# Patient Record
Sex: FEMALE | Race: ASIAN | NOT HISPANIC OR LATINO | URBAN - METROPOLITAN AREA
[De-identification: names, ages, dates, MRNs, and addresses within clinical notes are randomized per-mention and may not be internally consistent; named-entity substitution may affect disease eponyms.]

---

## 2019-06-21 ENCOUNTER — OUTPATIENT (OUTPATIENT)
Dept: OUTPATIENT SERVICES | Facility: HOSPITAL | Age: 19
LOS: 1 days | End: 2019-06-21

## 2019-06-21 VITALS
OXYGEN SATURATION: 96 % | SYSTOLIC BLOOD PRESSURE: 110 MMHG | WEIGHT: 145.06 LBS | RESPIRATION RATE: 18 BRPM | HEART RATE: 64 BPM | TEMPERATURE: 98 F | DIASTOLIC BLOOD PRESSURE: 80 MMHG | HEIGHT: 60 IN

## 2019-06-21 DIAGNOSIS — J35.8 OTHER CHRONIC DISEASES OF TONSILS AND ADENOIDS: ICD-10-CM

## 2019-06-21 LAB
HCG SERPL-ACNC: < 5 MIU/ML — SIGNIFICANT CHANGE UP
HCT VFR BLD CALC: 43.5 % — SIGNIFICANT CHANGE UP (ref 34.5–45)
HGB BLD-MCNC: 13.9 G/DL — SIGNIFICANT CHANGE UP (ref 11.5–15.5)
MCHC RBC-ENTMCNC: 29.6 PG — SIGNIFICANT CHANGE UP (ref 27–34)
MCHC RBC-ENTMCNC: 32 % — SIGNIFICANT CHANGE UP (ref 32–36)
MCV RBC AUTO: 92.6 FL — SIGNIFICANT CHANGE UP (ref 80–100)
NRBC # FLD: 0 K/UL — SIGNIFICANT CHANGE UP (ref 0–0)
PLATELET # BLD AUTO: 358 K/UL — SIGNIFICANT CHANGE UP (ref 150–400)
PMV BLD: 9.6 FL — SIGNIFICANT CHANGE UP (ref 7–13)
RBC # BLD: 4.7 M/UL — SIGNIFICANT CHANGE UP (ref 3.8–5.2)
RBC # FLD: 13.3 % — SIGNIFICANT CHANGE UP (ref 10.3–14.5)
WBC # BLD: 10.3 K/UL — SIGNIFICANT CHANGE UP (ref 3.8–10.5)
WBC # FLD AUTO: 10.3 K/UL — SIGNIFICANT CHANGE UP (ref 3.8–10.5)

## 2019-06-21 NOTE — H&P PST ADULT - ASSESSMENT
18 yo female presents to PST unit with pre-op diagnosis of other chronic diseases of tonsils and adenoids scheduled for tonsillectomy and adenoidectomy on 07/11/2019. She reports enlarged tonsils and  frequent tonsillitis.

## 2019-06-21 NOTE — H&P PST ADULT - NSANTHOSAYNRD_GEN_A_CORE
No. BIANCA screening performed.  STOP BANG Legend: 0-2 = LOW Risk; 3-4 = INTERMEDIATE Risk; 5-8 = HIGH Risk

## 2019-06-21 NOTE — H&P PST ADULT - RS GEN PE MLT RESP DETAILS PC
airway patent/clear to auscultation bilaterally/good air movement/respirations non-labored/breath sounds equal/no wheezes

## 2019-06-21 NOTE — H&P PST ADULT - ENMT COMMENTS
mild discomfort when swallowing food, pre-op diagnosis other chronic diseases of tonsils and adenoids.

## 2019-06-21 NOTE — H&P PST ADULT - HISTORY OF PRESENT ILLNESS
20 yo female presents to PST unit with pre-op diagnosis of other chronic diseases of tonsils and adenoids scheduled for tonsillectomy and adenoidectomy on 07/11/2019. She reports frequent tonsillitis. 20 yo female presents to PST unit with pre-op diagnosis of other chronic diseases of tonsils and adenoids scheduled for tonsillectomy and adenoidectomy on 07/11/2019. She reports enlarged tonsils and  frequent tonsillitis.

## 2019-06-21 NOTE — H&P PST ADULT - NSICDXPROBLEM_GEN_ALL_CORE_FT
PROBLEM DIAGNOSES  Problem: Other chronic diseases of tonsils and adenoids  Assessment and Plan:  Scheduled for tonsillectomy and adenoidectomy on 07/11/2019.   Verbal and written pre-op instructions provided to patient. Patient verbalized understanding.  Pepcid for GI prophylaxis provided.   Urine pregnancy test DOS-Urine cup provided.

## 2019-07-10 ENCOUNTER — TRANSCRIPTION ENCOUNTER (OUTPATIENT)
Age: 19
End: 2019-07-10

## 2019-07-11 ENCOUNTER — OUTPATIENT (OUTPATIENT)
Dept: OUTPATIENT SERVICES | Facility: HOSPITAL | Age: 19
LOS: 1 days | Discharge: HOME | End: 2019-07-11
Payer: MEDICAID

## 2019-07-11 VITALS
OXYGEN SATURATION: 99 % | RESPIRATION RATE: 16 BRPM | TEMPERATURE: 99 F | WEIGHT: 145.06 LBS | DIASTOLIC BLOOD PRESSURE: 76 MMHG | HEART RATE: 86 BPM | SYSTOLIC BLOOD PRESSURE: 109 MMHG | HEIGHT: 60 IN

## 2019-07-11 VITALS
HEART RATE: 78 BPM | DIASTOLIC BLOOD PRESSURE: 69 MMHG | TEMPERATURE: 97 F | SYSTOLIC BLOOD PRESSURE: 103 MMHG | RESPIRATION RATE: 16 BRPM | OXYGEN SATURATION: 99 %

## 2019-07-11 DIAGNOSIS — J35.8 OTHER CHRONIC DISEASES OF TONSILS AND ADENOIDS: ICD-10-CM

## 2019-07-11 PROCEDURE — 88304 TISSUE EXAM BY PATHOLOGIST: CPT | Mod: 26

## 2019-07-11 RX ORDER — AMOXICILLIN 250 MG/5ML
10 SUSPENSION, RECONSTITUTED, ORAL (ML) ORAL
Qty: 200 | Refills: 0
Start: 2019-07-11 | End: 2019-07-17

## 2019-07-11 NOTE — ASU DISCHARGE PLAN (ADULT/PEDIATRIC) - CARE PROVIDER_API CALL
Armen Spencre)  Otolaryngology  [address] [place] [address]  Phone: [phone]  Fax: [phone]  Follow Up Time:

## 2019-07-11 NOTE — BRIEF OPERATIVE NOTE - NSICDXBRIEFPOSTOP_GEN_ALL_CORE_FT
POST-OP DIAGNOSIS:  Asymmetric tonsils 11-Jul-2019 09:30:59  Armen Spencer  Chronic tonsillitis 11-Jul-2019 09:30:40  Armen Spencer

## 2019-07-11 NOTE — ASU DISCHARGE PLAN (ADULT/PEDIATRIC) - CALL YOUR DOCTOR IF YOU HAVE ANY OF THE FOLLOWING:
Fever greater than (need to indicate Fahrenheit or Celsius)/Bleeding that does not stop/Nausea and vomiting that does not stop/Inability to tolerate liquids or foods/Pain not relieved by Medications/Unable to urinate/Excessive diarrhea/Increased irritability or sluggishness

## 2019-07-11 NOTE — BRIEF OPERATIVE NOTE - NSICDXBRIEFPREOP_GEN_ALL_CORE_FT
PRE-OP DIAGNOSIS:  Asymmetric tonsils 11-Jul-2019 09:30:24  Amren Spencer  Chronic tonsillitis 11-Jul-2019 09:30:06  Armen Spencer

## 2020-10-31 NOTE — H&P PST ADULT - TONSILS
Hernesto Andresde is due for a CMP recheck. TSH in March 2021. Would you like to add any other labs?       Future Appointments   Date Time Provider Ciara Lockhart   11/2/2020  1:30 PM REF [facility] REF [facility] Ref Lab [facility]
Nothing else
WHEN IS SHE DUE FOR LABS NEXT?
enlarged

## 2021-03-10 ENCOUNTER — TRANSCRIPTION ENCOUNTER (OUTPATIENT)
Age: 21
End: 2021-03-10

## 2021-06-13 ENCOUNTER — TRANSCRIPTION ENCOUNTER (OUTPATIENT)
Age: 21
End: 2021-06-13

## 2021-08-01 ENCOUNTER — TRANSCRIPTION ENCOUNTER (OUTPATIENT)
Age: 21
End: 2021-08-01

## 2021-09-09 ENCOUNTER — TRANSCRIPTION ENCOUNTER (OUTPATIENT)
Age: 21
End: 2021-09-09

## 2022-05-26 NOTE — H&P PST ADULT - ENDOCRINE
Thoracic Consult  Assessment/Plan:   24-year-old female with a previous 20 pack-year smoking history quit greater than 15 years ago who has an irregular left upper lobe lung consolidation    I extensively reviewed imaging with this patient  Given her recent upper respiratory infection and the imaging characteristics of this area I suspect this is a benign infectious or inflammatory process  With that I would like to repeat her CT in 3 months  We will order a noncontrast navigational bronchoscopy protocol CT for August of 2022 and see her back in our office after that  She voiced understanding and agreement with this treatment plan  Monse Shepherd MD      Diagnoses and all orders for this visit:    Lung mass  Comments:  Etiology of this is uncertain  Malignancy is a possibility  Further imaging will be necessary  Orders:  -     Ambulatory Referral to Thoracic Oncology  -     CT chest wo contrast; Future            Thoracic History     Problem:  A irregular left upper lobe 3 cm opacity  Previous 20 pack-year smoking history    Procedure:     Pathology:      Subjective:    Patient ID: Mayela Cast is a 77 y o  female  HPI  Lauri Santiago is a 24-year-old female with history of hypertension, migraines, aortic insufficiency, stage III CKD, hyperlipidemia, and a previous 20 pack-year smoking history and obesity with a BMI of 35 who we are seeing for a left upper lobe opacity  Due to her previous smoking history her PCP ordered a lung cancer screening scan  Three weeks prior to her scan she had an upper respiratory symptoms with significant productive cough and shortness of breath  She was COVID negative at that time  Currently she states her breathing is getting better  She denies any current cough  No hemoptysis  No recent fevers, chills or unexplained weight loss  She denies a family history of lung malignancy      The following portions of the patient's history were reviewed and updated as appropriate: allergies, current medications, past family history, past medical history, past social history, past surgical history and problem list     Past Medical History:   Diagnosis Date    Allergic rhinitis     Aortic regurgitation     HTN (hypertension)     Hyperlipidemia     Nonrheumatic aortic (valve) insufficiency     Sleep apnea     Vitamin B12 deficiency       Past Surgical History:   Procedure Laterality Date    BUNIONECTOMY      CHOLECYSTECTOMY      COLONOSCOPY  2018    DILATION AND CURETTAGE OF UTERUS      MAMMO (HISTORICAL)  2017 and 3/21/2017     [place] LAP,APPENDECTOMY N/A 2021    Procedure: APPENDECTOMY LAPAROSCOPIC;  Surgeon: Jone Hooker MD;  Location:  MAIN OR;  Service: Colorectal    WISDOM TOOTH EXTRACTION        Family History   Problem Relation Age of Onset    Coronary artery disease Mother     Kidney cancer Father     No Known Problems Sister     No Known Problems Sister     No Known Problems Sister       Social History     Socioeconomic History    Marital status: /Civil Union     Spouse name: Not on file    Number of children: 2    Years of education: Not on file    Highest education level: Not on file   Occupational History    Not on file   Tobacco Use    Smoking status: Former Smoker     Types: Cigarettes     Quit date:      Years since quittin 4    Smokeless tobacco: Never Used   Vaping Use    Vaping Use: Never used   Substance and Sexual Activity    Alcohol use:  Yes     Alcohol/week: 5 0 standard drinks     Types: 5 Standard drinks or equivalent per week    Drug use: Never    Sexual activity: Not Currently   Other Topics Concern    Not on file   Social History Narrative    2 daughters    Travel outside [place]: No      Social Determinants of Health     Financial Resource Strain: Not on file   Food Insecurity: Not on file   Transportation Needs: Not on file   Physical Activity: Not on file   Stress: Not on file Social Connections: Not on file   Intimate Partner Violence: Not on file   Housing Stability: Not on file      Review of Systems   Constitutional: Negative for activity change, appetite change, chills, diaphoresis, fatigue, fever and unexpected weight change  HENT: Negative  Eyes: Negative  Respiratory: Negative for apnea, cough, chest tightness, shortness of breath, wheezing and stridor  Cardiovascular: Negative for chest pain, palpitations and leg swelling  Gastrointestinal: Negative for abdominal distention, abdominal pain, blood in stool, constipation, diarrhea, nausea and vomiting  Endocrine: Negative  Genitourinary: Negative  Musculoskeletal: Negative  Skin: Negative  Allergic/Immunologic: Negative  Neurological: Negative  Hematological: Negative  Psychiatric/Behavioral: Negative  Objective:   Physical Exam  Vitals and nursing note reviewed  Constitutional:       General: She is not in acute distress  Appearance: She is well-developed  She is not diaphoretic  HENT:      Head: Normocephalic and atraumatic  Mouth/Throat:      Pharynx: No oropharyngeal exudate  Eyes:      General: No scleral icterus  Conjunctiva/sclera: Conjunctivae normal       Pupils: Pupils are equal, round, and reactive to light  Neck:      Thyroid: No thyromegaly  Vascular: No JVD  Trachea: No tracheal deviation  Cardiovascular:      Rate and Rhythm: Normal rate and regular rhythm  Heart sounds: Normal heart sounds  No murmur heard  No friction rub  No gallop  Pulmonary:      Effort: Pulmonary effort is normal  No respiratory distress  Breath sounds: Normal breath sounds  No wheezing or rales  Comments: Normal work of breathing on room air  No appreciable wheezing  Chest:      Chest wall: No tenderness  Abdominal:      General: Bowel sounds are normal  There is no distension  Palpations: Abdomen is soft  There is no mass  Tenderness: There is no abdominal tenderness  There is no guarding or rebound  Musculoskeletal:         General: No tenderness or deformity  Normal range of motion  Cervical back: Normal range of motion and neck supple  Skin:     General: Skin is warm and dry  Coloration: Skin is not pale  Findings: No erythema or rash  Neurological:      Mental Status: She is alert and oriented to person, place, and time  Psychiatric:         Behavior: Behavior normal          Thought Content: Thought content normal          Judgment: Judgment normal      /76   Pulse 81   Temp 98 °F (36 7 °C)   Resp 17   Ht 5' 6" (1 676 m)   Wt 99 8 kg (220 lb 0 3 oz)   SpO2 97%   BMI 35 51 kg/m²     XR chest pa & lateral    Result Date: 7/29/2021  Impression No acute cardiopulmonary disease  Workstation performed: [id number]      I personally reviewed her lung cancer screening CT from 05/19/2022  This shows an irregular left upper lobe patchy consolidation  In total the dimensions of this are known 3 cm but this is not continuous  There is patchy inflammatory changes  The largest solid nodule was 8 mm    There is a mild slightly enlarged pretracheal lymph node with a fat center measuring 1 2 cm      Macey Cabral MD  Thoracic Surgeon    (Available by [facility] Text) negative

## 2022-07-03 ENCOUNTER — NON-APPOINTMENT (OUTPATIENT)
Age: 22
End: 2022-07-03

## 2023-01-17 ENCOUNTER — NON-APPOINTMENT (OUTPATIENT)
Age: 23
End: 2023-01-17

## 2023-07-30 ENCOUNTER — NON-APPOINTMENT (OUTPATIENT)
Age: 23
End: 2023-07-30

## 2023-08-01 PROBLEM — J35.8 OTHER CHRONIC DISEASES OF TONSILS AND ADENOIDS: Chronic | Status: ACTIVE | Noted: 2019-06-21

## 2023-08-02 ENCOUNTER — APPOINTMENT (OUTPATIENT)
Dept: ORTHOPEDIC SURGERY | Facility: CLINIC | Age: 23
End: 2023-08-02
Payer: COMMERCIAL

## 2023-08-02 VITALS — WEIGHT: 150 LBS | BODY MASS INDEX: 28.32 KG/M2 | HEIGHT: 61 IN

## 2023-08-02 DIAGNOSIS — S93.401A SPRAIN OF UNSPECIFIED LIGAMENT OF RIGHT ANKLE, INITIAL ENCOUNTER: ICD-10-CM

## 2023-08-02 DIAGNOSIS — Z78.9 OTHER SPECIFIED HEALTH STATUS: ICD-10-CM

## 2023-08-02 PROBLEM — Z00.00 ENCOUNTER FOR PREVENTIVE HEALTH EXAMINATION: Status: ACTIVE | Noted: 2023-08-02

## 2023-08-02 PROCEDURE — 99024 POSTOP FOLLOW-UP VISIT: CPT

## 2023-08-02 RX ORDER — MELOXICAM 15 MG/1
15 TABLET ORAL DAILY
Qty: 30 | Refills: 3 | Status: COMPLETED | COMMUNITY
Start: 2023-08-02 | End: 1900-01-01

## 2023-08-02 NOTE — PHYSICAL EXAM
[Right] : right foot and ankle [NL (40)] : plantar flexion 40 degrees [NL 30)] : inversion 30 degrees [2+] : posterior tibialis pulse: 2+ [Normal] : saphenous nerve sensation normal [Moderate] : moderate swelling of lateral ankle [Mild] : mild swelling of lateral foot [4___] : eversion 4[unfilled]/5 [] : pain with single heel rise [de-identified] : eversion 10 degrees [TWNoteComboBox7] : dorsiflexion 10 degrees

## 2023-08-02 NOTE — HISTORY OF PRESENT ILLNESS
[6] : 6 [0] : 0 [Sharp] : sharp [de-identified] : Pt is a 23 year old female who presents today for evaluation of her right ankle. Pt states that she fell at a bachelorette in [place] on 7/29/23. Reports lateral ankle pain. Had x[facility] taken [facility]/23 [facility]. Ice to affected area. No previous injury/problem with right ankle. NW wheelchair and splint.  [FreeTextEntry1] : Right Foot

## 2023-08-02 NOTE — DATA REVIEWED
[Outside X-rays] : outside x-rays [Right] : of the right [Ankle] : ankle [I independently reviewed and interpreted images and report] : I independently reviewed and interpreted images and report [FreeTextEntry1] : [facility]: No acute fractures or bony abnormalities noted.

## 2023-08-02 NOTE — ASSESSMENT
[FreeTextEntry1] : The patient was explained that they have an ankle sprain. Weight bearing in a supportive brace was recommended.  Icing for 20 minutes 2-3 times per day was instructed. Physical therapy was prescribed, and home range of motion exercises were encouraged.

## 2023-08-23 ENCOUNTER — APPOINTMENT (OUTPATIENT)
Dept: ORTHOPEDIC SURGERY | Facility: CLINIC | Age: 23
End: 2023-08-23

## 2024-05-14 ENCOUNTER — NON-APPOINTMENT (OUTPATIENT)
Age: 24
End: 2024-05-14

## 2024-10-09 ENCOUNTER — NON-APPOINTMENT (OUTPATIENT)
Age: 24
End: 2024-10-09